# Patient Record
Sex: FEMALE | URBAN - METROPOLITAN AREA
[De-identification: names, ages, dates, MRNs, and addresses within clinical notes are randomized per-mention and may not be internally consistent; named-entity substitution may affect disease eponyms.]

---

## 2023-11-21 ENCOUNTER — OFFICE VISIT (OUTPATIENT)
Dept: URBAN - METROPOLITAN AREA CLINIC 19 | Facility: CLINIC | Age: 28
End: 2023-11-21

## 2025-01-08 ENCOUNTER — OFFICE VISIT (OUTPATIENT)
Dept: URBAN - METROPOLITAN AREA CLINIC 19 | Facility: CLINIC | Age: 30
End: 2025-01-08
Payer: COMMERCIAL

## 2025-01-08 ENCOUNTER — DASHBOARD ENCOUNTERS (OUTPATIENT)
Age: 30
End: 2025-01-08

## 2025-01-08 VITALS
SYSTOLIC BLOOD PRESSURE: 125 MMHG | OXYGEN SATURATION: 100 % | BODY MASS INDEX: 37.11 KG/M2 | TEMPERATURE: 98.6 F | HEART RATE: 101 BPM | HEIGHT: 60 IN | WEIGHT: 189 LBS | DIASTOLIC BLOOD PRESSURE: 85 MMHG

## 2025-01-08 DIAGNOSIS — K82.4 GALLBLADDER POLYP: ICD-10-CM

## 2025-01-08 DIAGNOSIS — R10.11 RIGHT UPPER QUADRANT PAIN: ICD-10-CM

## 2025-01-08 PROBLEM — 197433003: Status: ACTIVE | Noted: 2025-01-08

## 2025-01-08 PROCEDURE — 99203 OFFICE O/P NEW LOW 30 MIN: CPT | Performed by: STUDENT IN AN ORGANIZED HEALTH CARE EDUCATION/TRAINING PROGRAM

## 2025-01-08 NOTE — HPI-TODAY'S VISIT:
This is a 29-year-old female that presents to clinic today after recent ED visit on 12/28/2024.  At that time patient was presenting with right upper abdominal pain.  During her hospital visit her labs showed WBC 10.3, hemoglobin 11.7, MCV 81.6, platelet 300. Blood chemistry showed total bilirubin 0.4, alk phos 123 slightly elevated, AST 16 ALT 14 lipase level 28 She had a CT abdomen done CT abdomen 12/28/2024 No acute findings Area of focal volume loss in the upper lobes of both kidneys keeping with scarring If clinically concern for acute cholecystitis then further evaluation with right upper quadrant ultrasound could be evaluated. Right upper quadrant ultrasound no shadowing gallstones.  No gallbladder wall thickening or pericholecystic fluid.  No sonographic Donato sign. 7 mm nodular shadowing rounded focus adherent to the gallbladder wall 7 mm.  Gallbladder wall polyp.  No evidence of biliary obstruction or gallstones. Patient decayed that her symptoms started 3 days before she presented to the ED.  She indicates that her pain is in the right upper quadrant sometimes radiates to the back area.  Nothing seems to make the pain worse including eating food.  Nothing seems to alleviate the pain.  Sometimes the pain gets intense and she has to take deep breaths.  She she does have some associated nausea and vomiting.  No previous history of right upper quadrant discomfort before.  No previous abdominal surgeries.  No previous EGD or colonoscopy.  Denies any NSAID intake.

## 2025-02-17 ENCOUNTER — TELEPHONE ENCOUNTER (OUTPATIENT)
Dept: URBAN - METROPOLITAN AREA CLINIC 19 | Facility: CLINIC | Age: 30
End: 2025-02-17

## 2025-02-20 ENCOUNTER — TELEPHONE ENCOUNTER (OUTPATIENT)
Dept: URBAN - METROPOLITAN AREA CLINIC 19 | Facility: CLINIC | Age: 30
End: 2025-02-20

## 2025-03-03 ENCOUNTER — LAB OUTSIDE AN ENCOUNTER (OUTPATIENT)
Dept: URBAN - METROPOLITAN AREA CLINIC 19 | Facility: CLINIC | Age: 30
End: 2025-03-03

## 2025-03-14 ENCOUNTER — OFFICE VISIT (OUTPATIENT)
Dept: URBAN - METROPOLITAN AREA SURGERY CENTER 31 | Facility: SURGERY CENTER | Age: 30
End: 2025-03-14
Payer: COMMERCIAL

## 2025-03-14 ENCOUNTER — CLAIMS CREATED FROM THE CLAIM WINDOW (OUTPATIENT)
Dept: URBAN - METROPOLITAN AREA CLINIC 4 | Facility: CLINIC | Age: 30
End: 2025-03-14
Payer: COMMERCIAL

## 2025-03-14 DIAGNOSIS — K29.60 ADENOPAPILLOMATOSIS GASTRICA: ICD-10-CM

## 2025-03-14 DIAGNOSIS — B96.81 HELICOBACTER PYLORI [H. PYLORI] AS THE CAUSE OF DISEASES CLASSIFIED ELSEWHERE: ICD-10-CM

## 2025-03-14 DIAGNOSIS — B96.81 BACTERIAL INFECTION DUE TO H. PYLORI: ICD-10-CM

## 2025-03-14 DIAGNOSIS — K29.60 OTHER GASTRITIS WITHOUT BLEEDING: ICD-10-CM

## 2025-03-14 DIAGNOSIS — K29.70 GASTRITIS, UNSPECIFIED, WITHOUT BLEEDING: ICD-10-CM

## 2025-03-14 PROCEDURE — 88342 IMHCHEM/IMCYTCHM 1ST ANTB: CPT | Performed by: PATHOLOGY

## 2025-03-14 PROCEDURE — 88341 IMHCHEM/IMCYTCHM EA ADD ANTB: CPT | Performed by: PATHOLOGY

## 2025-03-14 PROCEDURE — 88305 TISSUE EXAM BY PATHOLOGIST: CPT | Performed by: PATHOLOGY

## 2025-03-14 PROCEDURE — 43239 EGD BIOPSY SINGLE/MULTIPLE: CPT | Performed by: STUDENT IN AN ORGANIZED HEALTH CARE EDUCATION/TRAINING PROGRAM

## 2025-03-14 PROCEDURE — 00731 ANES UPR GI NDSC PX NOS: CPT | Performed by: NURSE ANESTHETIST, CERTIFIED REGISTERED

## 2025-03-20 ENCOUNTER — TELEPHONE ENCOUNTER (OUTPATIENT)
Dept: URBAN - METROPOLITAN AREA CLINIC 19 | Facility: CLINIC | Age: 30
End: 2025-03-20

## 2025-03-20 RX ORDER — BISMUTH SUBSALICYLATE 262 MG/1
2 TABLETS WITH MEALS AND AT BEDTIME TABLET, CHEWABLE ORAL
Qty: 112 | Refills: 0 | OUTPATIENT
Start: 2025-03-21 | End: 2025-04-04

## 2025-03-20 RX ORDER — TETRACYCLINE HYDROCHLORIDE 500 MG/1
1 CAPSULE ON AN EMPTY STOMACH CAPSULE ORAL
Qty: 56 CAPSULE | Refills: 0 | OUTPATIENT
Start: 2025-03-21 | End: 2025-04-04

## 2025-03-20 RX ORDER — OMEPRAZOLE 40 MG/1
1 CAPSULE 30 MINUTES BEFORE MORNING MEAL CAPSULE, DELAYED RELEASE ORAL TWICE A DAY
Qty: 30 | Refills: 3 | OUTPATIENT
Start: 2025-03-21

## 2025-03-20 RX ORDER — METRONIDAZOLE 500 MG/1
1 TABLET TABLET ORAL
Qty: 56 TABLET | Refills: 0 | OUTPATIENT
Start: 2025-03-21 | End: 2025-04-04

## 2025-03-27 ENCOUNTER — ERX REFILL RESPONSE (OUTPATIENT)
Dept: URBAN - METROPOLITAN AREA CLINIC 19 | Facility: CLINIC | Age: 30
End: 2025-03-27

## 2025-03-27 RX ORDER — OMEPRAZOLE 40 MG/1
1 CAPSULE 30 MINUTES BEFORE MORNING MEAL CAPSULE, DELAYED RELEASE ORAL TWICE A DAY
Qty: 30 | Refills: 3 | OUTPATIENT

## 2025-03-27 RX ORDER — OMEPRAZOLE 40 MG/1
1 CAPSULE 1/2 TO 1 HOUR BEFORE MORNING MEAL CAPSULE, DELAYED RELEASE ORAL ONCE A DAY
Qty: 30 | Refills: 0 | OUTPATIENT

## 2025-04-01 ENCOUNTER — TELEPHONE ENCOUNTER (OUTPATIENT)
Dept: URBAN - METROPOLITAN AREA CLINIC 19 | Facility: CLINIC | Age: 30
End: 2025-04-01

## 2025-04-04 ENCOUNTER — ERX REFILL RESPONSE (OUTPATIENT)
Dept: URBAN - METROPOLITAN AREA CLINIC 19 | Facility: CLINIC | Age: 30
End: 2025-04-04

## 2025-04-04 RX ORDER — OMEPRAZOLE 40 MG/1
1 CAPSULE 30 MINUTES BEFORE MORNING MEAL ORALLY TWICE A DAY CAPSULE, DELAYED RELEASE ORAL
Qty: 30 CAPSULE | Refills: 3 | OUTPATIENT

## 2025-04-04 RX ORDER — OMEPRAZOLE 40 MG/1
1 CAPSULE 30 MINUTES BEFORE MORNING MEAL ORALLY TWICE A DAY CAPSULE, DELAYED RELEASE ORAL
Qty: 180 CAPSULE | Refills: 1 | OUTPATIENT

## 2025-04-18 ENCOUNTER — OFFICE VISIT (OUTPATIENT)
Dept: URBAN - METROPOLITAN AREA CLINIC 19 | Facility: CLINIC | Age: 30
End: 2025-04-18
Payer: COMMERCIAL

## 2025-04-18 DIAGNOSIS — A04.8 H. PYLORI INFECTION: ICD-10-CM

## 2025-04-18 PROCEDURE — 99213 OFFICE O/P EST LOW 20 MIN: CPT

## 2025-04-18 RX ORDER — AMOXICILLIN 500 MG/1
2 CAPSULES CAPSULE ORAL TWICE A DAY
Qty: 40 CAPSULE | Refills: 0 | OUTPATIENT
Start: 2025-04-18 | End: 2025-04-28

## 2025-04-18 RX ORDER — CLARITHROMYCIN 500 MG/1
1 TABLET TABLET, FILM COATED ORAL
Qty: 20 | Refills: 0 | OUTPATIENT
Start: 2025-04-18 | End: 2025-04-28

## 2025-04-18 RX ORDER — OMEPRAZOLE 40 MG/1
1 CAPSULE 30 MINUTES BEFORE MORNING MEAL ORALLY TWICE A DAY CAPSULE, DELAYED RELEASE ORAL
Qty: 180 CAPSULE | Refills: 1 | Status: ACTIVE | COMMUNITY

## 2025-04-18 NOTE — HPI-TODAY'S VISIT:
Ms. Chaudhary is a 30-year-old female with history of H. pylori presents today to discuss alternative treatment for H. pylori infection..  She was seen by Dr. Carson on 1/8/2025 after ER presentation with RUQ pain.  On 3/14/2025 EGD noted a normal stomach, gastroesophageal flap valve classified as Hill grade 2, normal esophagus and duodenum.  Stomach biopsy consistent with H. pylori gastritis.  She was started on quadruple therapy however she only took metronidazole for about a week as it caused stomach pain. Once she discontinued the medication the pain resolved.   Previous history summarized below: ED visit on 12/28/2024 with right upper abdominal pain.  CT showed no acute findings, Area of focal volume loss in the upper lobes of both kidneys keeping with scarring. Right upper quadrant ultrasound no shadowing gallstones. No gallbladder wall thickening or pericholecystic fluid. No sonographic Donato sign. 7 mm nodular shadowing rounded focus adherent to the gallbladder wall 7 mm. Gallbladder wall polyp. No evidence of biliary obstruction or gallstones.

## 2025-05-05 ENCOUNTER — LAB OUTSIDE AN ENCOUNTER (OUTPATIENT)
Dept: URBAN - METROPOLITAN AREA CLINIC 19 | Facility: CLINIC | Age: 30
End: 2025-05-05